# Patient Record
Sex: FEMALE | Race: WHITE
[De-identification: names, ages, dates, MRNs, and addresses within clinical notes are randomized per-mention and may not be internally consistent; named-entity substitution may affect disease eponyms.]

---

## 2018-03-10 ENCOUNTER — HOSPITAL ENCOUNTER (EMERGENCY)
Dept: HOSPITAL 47 - EC | Age: 33
Discharge: HOME | End: 2018-03-10
Payer: COMMERCIAL

## 2018-03-10 VITALS
SYSTOLIC BLOOD PRESSURE: 144 MMHG | TEMPERATURE: 98.2 F | RESPIRATION RATE: 18 BRPM | DIASTOLIC BLOOD PRESSURE: 96 MMHG | HEART RATE: 88 BPM

## 2018-03-10 DIAGNOSIS — Z79.899: ICD-10-CM

## 2018-03-10 DIAGNOSIS — R22.2: Primary | ICD-10-CM

## 2018-03-10 DIAGNOSIS — F17.200: ICD-10-CM

## 2018-03-10 PROCEDURE — 76705 ECHO EXAM OF ABDOMEN: CPT

## 2018-03-10 PROCEDURE — 76857 US EXAM PELVIC LIMITED: CPT

## 2018-03-10 PROCEDURE — 99283 EMERGENCY DEPT VISIT LOW MDM: CPT

## 2018-03-10 NOTE — ED
Skin/Abscess/FB HPI





- General


Chief complaint: Skin/Abscess/Foreign Body


Stated complaint: lump close to (4yrs)


Time Seen by Provider: 03/10/18 16:31


Source: patient, RN notes reviewed


Mode of arrival: ambulatory


Limitations: no limitations





- History of Present Illness


Initial comments: 


This is a 32-year-old female who presents to the emergency department chief 

complaint of "lump near  scar."  Patient states that she had a C-

section 4 years ago.  She states that one year ago she noticed a small mass 

above her  scar and attributed it to scar tissue.  Patient states that 

over the last couple of days the mass has grown in size.  She states that it is 

not painful but feels like a pressure. Denies fever, chills, chest pain, 

shortness of breath, abdominal pain, nausea or vomiting, constipation or 

diarrhea, dysuria or hematuria, numbness or tingling, headache or vision 

changes.  








- Related Data


 Home Medications











 Medication  Instructions  Recorded  Confirmed


 


Prenatal Vit-Iron-Folic Acid 1 caplet PO DAILY 14





[Prenatal-U Capsule]   











 Allergies











Allergy/AdvReac Type Severity Reaction Status Date / Time


 


No Known Allergies Allergy   Verified 03/10/18 16:23














Review of Systems


ROS Statement: 


Those systems with pertinent positive or pertinent negative responses have been 

documented in the HPI.





ROS Other: All systems not noted in ROS Statement are negative.





Past Medical History


Past Medical History: No Reported History


History of Any Multi-Drug Resistant Organisms: None Reported


Past Surgical History:  Section


Additional Past Surgical History / Comment(s): [place] teeth extraction


Past Anesthesia/Blood Transfusion Reactions: No Reported Reaction


Past Psychological History: Anxiety, Depression


Smoking Status: Current every day smoker


Past Alcohol Use History: None Reported


Past Drug Use History: None Reported





General Exam





- General Exam Comments


Initial Comments: 


General: Awake and alert, well-developed; in no apparent distress.


HEENT: Head atraumatic, normocephalic. Pupils are equal, round and reactive to 

light. Extraocular movements intact. Oropharynx moist without erythema or 

exudate. 


Neck: Supple. Normal ROM. 


Cardiovascular: Regular rate and rhythm. No murmurs, rubs or gallops. Chest 

symmetrical.  


Respiratory: Lungs clear to auscultation bilaterally. No wheezes, rales or 

rhonchi. Normal respiratory effort with no use of accessory muscles. 


Abdomen: Soft, non-tender, non-distended. No rigidity, rebound or guarding.  

There is a firm, mobile approximately 1 cm in diameter mass right suprapubic 

region superior to  scar.


Musculoskeletal: Normal ROM, no tenderness bilateral upper and lower 

extremities. Ambulating normally. 


Skin: Pink, warm and dry without rashes or lesions. 


Neurological: Alert and oriented x3. CN II-XII grossly intact. Speech is fluent 

and answers are appropriate. No focal neuro deficits. 


Psychiatric: Normal mood and affect. No overt signs of depression or anxiety 

noted. 














Limitations: no limitations





Course


 Vital Signs











  03/10/18





  16:21


 


Temperature 98.6 F


 


Pulse Rate 108 H


 


Respiratory 20





Rate 


 


Blood Pressure 160/92


 


O2 Sat by Pulse 100





Oximetry 














Medical Decision Making





- Medical Decision Making


This is a 32-year-old female who presented to the emergency department for 

evaluation of a "lump near  scar."  Patient states that the "lump" has 

been present for one year but has noticed an increase in size over the last 

couple of days.  There is a palpable firm, mobile 1 cm in diameter mass 

suprapubic region superior to previous  scar.  Mass is non-tender.  

Ultrasound revealed a hypoechoic vascular mass.  It indicated correlating for 

cellulitis or subcutaneous infection.  This area is not red, warm, tender or 

swollen.  Also indicated to correlate for endometriosis subcutaneously.  

Patient denies any history of endometriosis.  Recommended following up with 

patient's primary care provider for possible excision.  She is in no acute 

distress and will be discharged home.  Patient is in agreement with plan and 

voices understanding.  All questions were answered.








- Radiology Data


Radiology results: report reviewed


Ultrasound abdomen limited findings: Superficial 1.4 x 0.8 x 1.7 cm well-

defined hypoechoic vascular mass.  Impression: As above, differential would 

include subcutaneous infection or cellulitis.  Correlate clinically.  One must 

also consider subcutaneous endometriosis, correlate clinically.





Disposition


Clinical Impression: 


 Subcutaneous mass





Disposition: HOME SELF-CARE


Condition: Good


Instructions:  Soft Tissue Mass (ED)


Additional Instructions: 


Please follow up with primary care provider within 1-2 days. Return to 

emergency department if symptoms should worsen or any concerns arise. 


Referrals: 


Eren Bender MD [Primary Care Provider] - 1-2 days


Time of Disposition: 18:59

## 2018-03-10 NOTE — US
EXAMINATION TYPE: US pelvis limited

 

DATE OF EXAM: 3/10/2018

 

COMPARISON: NONE

 

CLINICAL HISTORY: suprapubic mass . Palpable pelvic mass just right of midline 
at  scar x past 6 months. 

 

Superficial 1.4 x 0.8 x 1.7cm well defined hypoechoic vascular mass. 

 

 

 

 

 

IMPRESSION:  As above, differential would include subcutaneous infection or 
cellulitis. Correlate clinically. One must also consider subcutaneous 
endometriosis, correlate clinically.

 

MTDD